# Patient Record
Sex: FEMALE | Race: WHITE | NOT HISPANIC OR LATINO | URBAN - METROPOLITAN AREA
[De-identification: names, ages, dates, MRNs, and addresses within clinical notes are randomized per-mention and may not be internally consistent; named-entity substitution may affect disease eponyms.]

---

## 2022-04-08 RX ORDER — SODIUM CHLORIDE 9 MG/ML
1000 INJECTION, SOLUTION INTRAVENOUS
Refills: 0 | Status: DISCONTINUED | OUTPATIENT
Start: 2022-04-13 | End: 2022-04-13

## 2022-04-12 NOTE — ASU PATIENT PROFILE, ADULT - FALL HARM RISK - UNIVERSAL INTERVENTIONS
Bed in lowest position, wheels locked, appropriate side rails in place/Call bell, personal items and telephone in reach/Instruct patient to call for assistance before getting out of bed or chair/Non-slip footwear when patient is out of bed/[place] to call system/Physically safe environment - no spills, clutter or unnecessary equipment/Purposeful Proactive Rounding/Room/bathroom lighting operational, light cord in reach

## 2022-04-12 NOTE — ASU PATIENT PROFILE, ADULT - NSICDXPASTMEDICALHX_GEN_ALL_CORE_FT
PAST MEDICAL HISTORY:  Hypertension      PAST MEDICAL HISTORY:  Hashimoto's disease     High cholesterol     Hypertension     Rheumatoid arthritis

## 2022-04-12 NOTE — ASU PATIENT PROFILE, ADULT - NSICDXPASTSURGICALHX_GEN_ALL_CORE_FT
PAST SURGICAL HISTORY:  History of ankle surgery     History of ankle surgery     History of right hip replacement      PAST SURGICAL HISTORY:  History of ankle surgery right    History of right hip replacement

## 2022-04-13 ENCOUNTER — OUTPATIENT (OUTPATIENT)
Dept: OUTPATIENT SERVICES | Facility: HOSPITAL | Age: 72
LOS: 1 days | Discharge: HOME | End: 2022-04-13

## 2022-04-13 VITALS
TEMPERATURE: 97 F | HEART RATE: 89 BPM | SYSTOLIC BLOOD PRESSURE: 127 MMHG | OXYGEN SATURATION: 98 % | DIASTOLIC BLOOD PRESSURE: 69 MMHG | RESPIRATION RATE: 18 BRPM

## 2022-04-13 VITALS
RESPIRATION RATE: 18 BRPM | DIASTOLIC BLOOD PRESSURE: 73 MMHG | SYSTOLIC BLOOD PRESSURE: 127 MMHG | WEIGHT: 146.39 LBS | HEART RATE: 88 BPM | TEMPERATURE: 98 F | OXYGEN SATURATION: 98 %

## 2022-04-13 DIAGNOSIS — Z98.890 OTHER SPECIFIED POSTPROCEDURAL STATES: Chronic | ICD-10-CM

## 2022-04-13 DIAGNOSIS — Z96.641 PRESENCE OF RIGHT ARTIFICIAL HIP JOINT: Chronic | ICD-10-CM

## 2022-04-13 DEVICE — LENS IOL ACRYSOF NAT CLR SA60WF 19.5D
Type: IMPLANTABLE DEVICE | Site: RIGHT | Status: NON-FUNCTIONAL
Removed: 2022-04-13

## 2022-04-13 RX ORDER — TROPICAMIDE 1 %
1 DROPS OPHTHALMIC (EYE)
Refills: 0 | Status: COMPLETED | OUTPATIENT
Start: 2022-04-13 | End: 2022-04-13

## 2022-04-13 RX ORDER — ACETAMINOPHEN 500 MG
2 TABLET ORAL
Qty: 0 | Refills: 0 | DISCHARGE
Start: 2022-04-13

## 2022-04-13 RX ORDER — KETOROLAC TROMETHAMINE 0.5 %
1 DROPS OPHTHALMIC (EYE)
Refills: 0 | Status: COMPLETED | OUTPATIENT
Start: 2022-04-13 | End: 2022-04-13

## 2022-04-13 RX ORDER — OFLOXACIN 0.3 %
1 DROPS OPHTHALMIC (EYE)
Refills: 0 | Status: COMPLETED | OUTPATIENT
Start: 2022-04-13 | End: 2022-04-13

## 2022-04-13 RX ORDER — ACETAMINOPHEN 500 MG
650 TABLET ORAL ONCE
Refills: 0 | Status: DISCONTINUED | OUTPATIENT
Start: 2022-04-13 | End: 2022-04-13

## 2022-04-13 RX ORDER — ONDANSETRON 8 MG/1
4 TABLET, FILM COATED ORAL ONCE
Refills: 0 | Status: DISCONTINUED | OUTPATIENT
Start: 2022-04-13 | End: 2022-04-13

## 2022-04-13 RX ORDER — SODIUM CHLORIDE 9 MG/ML
1000 INJECTION, SOLUTION INTRAVENOUS
Refills: 0 | Status: DISCONTINUED | OUTPATIENT
Start: 2022-04-13 | End: 2022-04-13

## 2022-04-13 RX ORDER — PHENYLEPHRINE HCL 2.5 %
1 DROPS OPHTHALMIC (EYE)
Refills: 0 | Status: COMPLETED | OUTPATIENT
Start: 2022-04-13 | End: 2022-04-13

## 2022-04-13 RX ADMIN — Medication 1 DROP(S): at 09:50

## 2022-04-13 RX ADMIN — Medication 1 DROP(S): at 09:40

## 2022-04-13 RX ADMIN — Medication 1 DROP(S): at 10:00

## 2022-04-13 NOTE — OPERATIVE REPORT - OPERATIVE RPOSRT DETAILS
Pre Op Dx: 1)Cataract Right Eye                        Post Op Dx: Same    Procedure: Cataract Extraction with Femtosecond laser incisions Right Eye    Surgeon: Antonio    Anesthesia: LMAC    Indications: Patient complains of progressive decrease in vision impairing activities of daily     living    ESTIMATED BLOOD LOSS: <1 cc    Procedure in detail:    Informed consent was obtained prior to admission. In the holding area, the operative eye was marked and topical antibiotic, mydriatic, and NSAID drops were administered. In the upright position, under topical anesthesia, the 3,6,9 o’clock meridians were marked at the limbus. The patient was brought to the Femtosecond laser suite and placed in the supine position. An operative timeout was observed. The laser program was reviewed and confirmed. Topical anesthetic was placed in the operative eye. The suction speculum was placed with care to center about the limbus. Suction was engaged and the well was filled with BSS. The patient was rotated under the laser and the chair elevated to submerge the ANSELMO.  Once docked and locked, the anterior segment OCT was performed and all anatomical margins were reviewed. The foot pedal was depressed and the entire laser program was carried out without loss of centration or suction. The suction was turned off and the patient removed from under the laser. The patient was then brought to the OR and placed in the supine position. Monitors were placed and IV sedation was given. The operative eye received topical anesthetic and was prepped and draped in the usual sterile fashion including Betadine irrigation of the conjunctival fornices and isolation of the lashes. An operative timeout was observed. A lid speculum was placed followed by additional topical anesthetic. A paracentesis was created and 1% non-preserved lidocaine was placed in the anterior chamber. The chamber was then filled with dispersive viscoelastic. A temporal near clear corneal incision was created laser created capsulotomy was assessed and the anterior capsule was removed. No tags or tears were noted. Gentle hydrodissection was performed allowing release of trapped gas from behind the lens nucleus. The lens nucleus was rotated. The phacoemulsification handpiece was then tested.  The nucleus was removed using a stop and chop technique taking care to keep the ultrasound tip at or below the iris plane. Additional dispersive viscoelastic was placed to protect the corneal endothelium as needed. Coaxial I/A was then used to remove the lens cortex. The capsular bag was inflated with cohesive viscoelastic and the posterior capsule was polished. The power of the foldable IOL was confirmed. The lens was brought onto the surgical field and inspected. It was placed in the insertion cartridge and loaded into the injector. The lens was delivered into the capsular bag where it unfolded atraumatically and centered well. The viscoelastic was removed from behind and in front of the lens with the I/A. BSS on a canula was used to irrigate any residual viscoelastic or nuclear pieces from the angle and to hydrate the incisions. The chamber was returned to physiologic pressure with BSS. The IOL was noted to be centered and stable and the lens was gently pressed posteriorly to seat it in the capsular fornix.  The wounds were tested and found to be water tight. The lid speculum was removed. The eye received topical antibiotics and a patch and shield. The patient tolerated the procedure well and went to recovery in good condition.

## 2022-04-23 PROBLEM — M06.9 RHEUMATOID ARTHRITIS, UNSPECIFIED: Chronic | Status: ACTIVE | Noted: 2022-04-13

## 2022-04-23 PROBLEM — E78.00 PURE HYPERCHOLESTEROLEMIA, UNSPECIFIED: Chronic | Status: ACTIVE | Noted: 2022-04-13

## 2022-04-23 PROBLEM — I10 ESSENTIAL (PRIMARY) HYPERTENSION: Chronic | Status: ACTIVE | Noted: 2022-04-12

## 2022-04-23 PROBLEM — E06.3 AUTOIMMUNE THYROIDITIS: Chronic | Status: ACTIVE | Noted: 2022-04-13

## 2022-04-26 RX ORDER — SODIUM CHLORIDE 9 MG/ML
1000 INJECTION, SOLUTION INTRAVENOUS
Refills: 0 | Status: DISCONTINUED | OUTPATIENT
Start: 2022-04-27 | End: 2022-04-27

## 2022-04-26 NOTE — ASU PATIENT PROFILE, ADULT - NSICDXPASTMEDICALHX_GEN_ALL_CORE_FT
PAST MEDICAL HISTORY:  Hashimoto's disease     High cholesterol     Hypertension     Rheumatoid arthritis      PAST MEDICAL HISTORY:  Hashimoto's disease     High cholesterol     History of transient ischemic attack (TIA)     Hypertension     Hypothyroid     Rheumatoid arthritis

## 2022-04-26 NOTE — ASU PATIENT PROFILE, ADULT - FALL HARM RISK - UNIVERSAL INTERVENTIONS
Bed in lowest position, wheels locked, appropriate side rails in place/Call bell, personal items and telephone in reach/Instruct patient to call for assistance before getting out of bed or chair/Non-slip footwear when patient is out of bed/[place] to call system/Physically safe environment - no spills, clutter or unnecessary equipment/Purposeful Proactive Rounding/Room/bathroom lighting operational, light cord in reach

## 2022-04-26 NOTE — ASU PATIENT PROFILE, ADULT - NSICDXPASTSURGICALHX_GEN_ALL_CORE_FT
PAST SURGICAL HISTORY:  History of ankle surgery right    History of right hip replacement      PAST SURGICAL HISTORY:  H/O cataract extraction right eye    History of ankle surgery right ORIF    History of right hip replacement

## 2022-04-27 ENCOUNTER — OUTPATIENT (OUTPATIENT)
Dept: OUTPATIENT SERVICES | Facility: HOSPITAL | Age: 72
LOS: 1 days | Discharge: HOME | End: 2022-04-27

## 2022-04-27 VITALS
DIASTOLIC BLOOD PRESSURE: 76 MMHG | RESPIRATION RATE: 16 BRPM | TEMPERATURE: 97 F | SYSTOLIC BLOOD PRESSURE: 136 MMHG | OXYGEN SATURATION: 97 % | HEART RATE: 91 BPM | WEIGHT: 146.83 LBS | HEIGHT: 60 IN

## 2022-04-27 VITALS
HEART RATE: 92 BPM | OXYGEN SATURATION: 100 % | DIASTOLIC BLOOD PRESSURE: 53 MMHG | SYSTOLIC BLOOD PRESSURE: 113 MMHG | RESPIRATION RATE: 16 BRPM | TEMPERATURE: 98 F

## 2022-04-27 DIAGNOSIS — Z98.49 CATARACT EXTRACTION STATUS, UNSPECIFIED EYE: Chronic | ICD-10-CM

## 2022-04-27 DIAGNOSIS — Z98.890 OTHER SPECIFIED POSTPROCEDURAL STATES: Chronic | ICD-10-CM

## 2022-04-27 DIAGNOSIS — Z96.641 PRESENCE OF RIGHT ARTIFICIAL HIP JOINT: Chronic | ICD-10-CM

## 2022-04-27 DEVICE — LENS IOL ACRYSOF NAT CLR SA60WF 22.5D
Type: IMPLANTABLE DEVICE | Site: RIGHT | Status: NON-FUNCTIONAL
Removed: 2022-04-27

## 2022-04-27 RX ORDER — CHOLECALCIFEROL (VITAMIN D3) 125 MCG
1 CAPSULE ORAL
Qty: 0 | Refills: 0 | DISCHARGE

## 2022-04-27 RX ORDER — KETOROLAC TROMETHAMINE 0.5 %
1 DROPS OPHTHALMIC (EYE)
Refills: 0 | Status: COMPLETED | OUTPATIENT
Start: 2022-04-27 | End: 2022-04-27

## 2022-04-27 RX ORDER — LOSARTAN POTASSIUM 100 MG/1
1 TABLET, FILM COATED ORAL
Qty: 0 | Refills: 0 | DISCHARGE

## 2022-04-27 RX ORDER — FOLIC ACID 0.8 MG
1 TABLET ORAL
Qty: 0 | Refills: 0 | DISCHARGE

## 2022-04-27 RX ORDER — ONDANSETRON 8 MG/1
4 TABLET, FILM COATED ORAL ONCE
Refills: 0 | Status: DISCONTINUED | OUTPATIENT
Start: 2022-04-27 | End: 2022-04-27

## 2022-04-27 RX ORDER — ACETAMINOPHEN 500 MG
650 TABLET ORAL ONCE
Refills: 0 | Status: DISCONTINUED | OUTPATIENT
Start: 2022-04-27 | End: 2022-04-27

## 2022-04-27 RX ORDER — LEVOTHYROXINE SODIUM 125 MCG
1 TABLET ORAL
Qty: 0 | Refills: 0 | DISCHARGE

## 2022-04-27 RX ORDER — TROPICAMIDE 1 %
1 DROPS OPHTHALMIC (EYE)
Refills: 0 | Status: COMPLETED | OUTPATIENT
Start: 2022-04-27 | End: 2022-04-27

## 2022-04-27 RX ORDER — ASPIRIN/CALCIUM CARB/MAGNESIUM 324 MG
1 TABLET ORAL
Qty: 0 | Refills: 0 | DISCHARGE

## 2022-04-27 RX ORDER — PHENYLEPHRINE HCL 2.5 %
1 DROPS OPHTHALMIC (EYE)
Refills: 0 | Status: COMPLETED | OUTPATIENT
Start: 2022-04-27 | End: 2022-04-27

## 2022-04-27 RX ORDER — OFLOXACIN 0.3 %
1 DROPS OPHTHALMIC (EYE)
Refills: 0 | Status: COMPLETED | OUTPATIENT
Start: 2022-04-27 | End: 2022-04-27

## 2022-04-27 RX ORDER — ROSUVASTATIN CALCIUM 5 MG/1
1 TABLET ORAL
Qty: 0 | Refills: 0 | DISCHARGE

## 2022-04-27 RX ORDER — CERTOLIZUMAB PEGOL 400 MG
1 KIT SUBCUTANEOUS
Qty: 0 | Refills: 0 | DISCHARGE

## 2022-04-27 RX ADMIN — Medication 1 DROP(S): at 09:58

## 2022-04-27 RX ADMIN — Medication 1 DROP(S): at 10:01

## 2022-04-27 RX ADMIN — Medication 1 DROP(S): at 10:06

## 2022-04-27 RX ADMIN — Medication 1 DROP(S): at 10:07

## 2022-04-27 RX ADMIN — Medication 1 DROP(S): at 10:02

## 2022-04-27 NOTE — OPERATIVE REPORT - OPERATIVE RPOSRT DETAILS
Pre Op Dx: 1)Cataract left eye                      2) Astigmatism    Post Op Dx: Same    Procedure: Cataract Extraction with Femtosecond laser incisions    Surgeon: Antonio    Anesthesia: LMAC    Indications: Patient complains of progressive decrease in vision impairing activities of daily     living and has significant refractive astigmatism     ESTIMATED BLOOD LOSS: <1 cc    Procedure in detail:    Informed consent was obtained prior to admission. In the holding area, the operative eye was marked and topical antibiotic, mydriatic, and NSAID drops were administered. In the upright position, under topical anesthesia, the 3,6,9 o’clock meridians were marked at the limbus. The patient was brought to the Femtosecond laser suite and placed in the supine position. An operative timeout was observed. The laser program was reviewed and confirmed. Topical anesthetic was placed in the operative eye. The suction speculum was placed with care to center about the limbus. Suction was engaged and the well was filled with BSS. The patient was rotated under the laser and the chair elevated to submerge the ANSELMO.  Once docked and locked, the anterior segment OCT was performed and all anatomical margins were reviewed. The foot pedal was depressed and the entire laser program was carried out without loss of centration or suction. The suction was turned off and the patient removed from under the laser. The patient was then brought to the OR and placed in the supine position. Monitors were placed and IV sedation was given. The operative eye received topical anesthetic and was prepped and draped in the usual sterile fashion including Betadine irrigation of the conjunctival fornices and isolation of the lashes. An operative timeout was observed. A lid speculum was placed followed by additional topical anesthetic. A paracentesis was created and 1% non-preserved lidocaine was placed in the anterior chamber. The chamber was then filled with dispersive viscoelastic. A temporal near clear corneal incision was created laser created capsulotomy was assessed and the anterior capsule was removed. No tags or tears were noted. Gentle hydrodissection was performed allowing release of trapped gas from behind the lens nucleus. The lens nucleus was rotated. The phacoemulsification handpiece was then tested.  The nucleus was removed using a stop and chop technique taking care to keep the ultrasound tip at or below the iris plane. Additional dispersive viscoelastic was placed to protect the corneal endothelium as needed. Coaxial I/A was then used to remove the lens cortex. The capsular bag was inflated with cohesive viscoelastic and the posterior capsule was polished. The power of the foldable IOL was confirmed. The lens was brought onto the surgical field and inspected. It was placed in the insertion cartridge and loaded into the injector. The lens was delivered into the capsular bag where it unfolded atraumatically and centered well. The viscoelastic was removed from behind and in front of the lens with the I/A. BSS on a canula was used to irrigate any residual viscoelastic or nuclear pieces from the angle and to hydrate the incisions. The chamber was returned to physiologic pressure with BSS. The IOL was noted to be centered and stable and the lens was gently pressed posteriorly to seat it in the capsular fornix.  The wounds were tested and found to be water tight. The lid speculum was removed. The eye received topical antibiotics and a patch and shield. The patient tolerated the procedure well and went to recovery in good condition.

## 2022-04-27 NOTE — ASU DISCHARGE PLAN (ADULT/PEDIATRIC) - NS MD DC FALL RISK RISK
For information on Fall & Injury Prevention, visit: [email or web address] OR  [email or web address] OR  https://www.cdc.gov/steadi/patient.html

## 2024-07-08 NOTE — ASU PATIENT PROFILE, ADULT - PREOP PAIN SCORE
well developed, well nourished , in no acute distress , ambulating without difficulty , normal communication ability 0

## (undated) DEVICE — CANNULA FLEX TIP 45 DEG 27GAX22MM

## (undated) DEVICE — CENTURION PAK FACO ACTIVE INTREPID FMS 0.9 MM ULTRA

## (undated) DEVICE — KIT CENTURION ANTERIOR

## (undated) DEVICE — PACK CENTURION 2.4MM

## (undated) DEVICE — MARKING PEN DEVON DUAL TIP W RULER

## (undated) DEVICE — KNIFE ALCON STANDARD FULL HANDLE 15 DEG (PINK)

## (undated) DEVICE — SUT NYLON 10-0 12" CU-5

## (undated) DEVICE — GLV 8 PROTEXIS (WHITE)

## (undated) DEVICE — SOL IRR BAG BSS 500ML

## (undated) DEVICE — NUCLEUS HYDRODISSECTOR PEARCE ANGLED 25G X 22MM

## (undated) DEVICE — DRAPE MICROSCOPE KNOB COVER SMALL (2 PCS)

## (undated) DEVICE — PACK ANTERIOR SEGMENT

## (undated) DEVICE — CAPSULE GUARD I/A

## (undated) DEVICE — Device